# Patient Record
Sex: FEMALE | Race: WHITE | NOT HISPANIC OR LATINO | URBAN - METROPOLITAN AREA
[De-identification: names, ages, dates, MRNs, and addresses within clinical notes are randomized per-mention and may not be internally consistent; named-entity substitution may affect disease eponyms.]

---

## 2017-07-05 ENCOUNTER — OFFICE VISIT (OUTPATIENT)
Dept: ENDOCRINOLOGY | Facility: CLINIC | Age: 62
End: 2017-07-05

## 2017-07-05 VITALS
DIASTOLIC BLOOD PRESSURE: 70 MMHG | SYSTOLIC BLOOD PRESSURE: 110 MMHG | HEIGHT: 66 IN | WEIGHT: 159.8 LBS | OXYGEN SATURATION: 99 % | BODY MASS INDEX: 25.68 KG/M2 | HEART RATE: 72 BPM

## 2017-07-05 DIAGNOSIS — E03.8 HYPOTHYROIDISM DUE TO HASHIMOTO'S THYROIDITIS: Primary | ICD-10-CM

## 2017-07-05 DIAGNOSIS — E06.3 HYPOTHYROIDISM DUE TO HASHIMOTO'S THYROIDITIS: Primary | ICD-10-CM

## 2017-07-05 PROBLEM — F41.8 DEPRESSION WITH ANXIETY: Status: ACTIVE | Noted: 2017-07-05

## 2017-07-05 PROCEDURE — 99213 OFFICE O/P EST LOW 20 MIN: CPT | Performed by: INTERNAL MEDICINE

## 2017-07-05 RX ORDER — CITALOPRAM 40 MG/1
40 TABLET ORAL DAILY
COMMUNITY
End: 2018-01-10

## 2017-07-05 RX ORDER — IBUPROFEN 800 MG/1
TABLET ORAL
Refills: 2 | COMMUNITY
Start: 2017-06-29

## 2017-07-05 RX ORDER — ATORVASTATIN CALCIUM 40 MG/1
40 TABLET, FILM COATED ORAL DAILY
COMMUNITY

## 2017-07-05 RX ORDER — AMOXICILLIN 500 MG/1
CAPSULE ORAL
Refills: 0 | COMMUNITY
Start: 2017-06-29 | End: 2018-01-10

## 2017-07-05 RX ORDER — LEVOTHYROXINE SODIUM 88 UG/1
88 TABLET ORAL DAILY
COMMUNITY
End: 2017-07-05 | Stop reason: SDUPTHER

## 2017-07-05 RX ORDER — LEVOTHYROXINE SODIUM 88 UG/1
88 TABLET ORAL EVERY MORNING
Qty: 90 TABLET | Refills: 3 | Status: SHIPPED | OUTPATIENT
Start: 2017-07-05 | End: 2018-01-24 | Stop reason: SDUPTHER

## 2017-07-05 NOTE — PROGRESS NOTES
"Chief Complaint   Patient presents with   • Hypothyroidism     Follow UP        HPI:   Desi Chavarria is a 61 y.o.female who presents to Endocrine Clinic for f/u evaluation of her hypothyroidism. Last clinic visit with me on 09/24/2015 at [facility]. She is transferring care to my new practice.  Her history is as follows:    Interim Events:  - reports increased stress/depression with recent deaths in family  - strained back while doing yard work  - otherwise no new medical issues    1) hypothyroidism due to Hashimoto's thyroiditis:  - h/o goiter on physical exam diagnosed at the age of 20. She had normal thyroid function at that time, but was prescribed 25 mcg of levothyroxine daily for goiter suppression. She stopped the medication a few months later on her own accord due to symptoms of palpitations/\"jitteriness\".    - In October/November 2014 outside lab evaluation was consistent with hypothyroidism:  10/2014: TSH 10.46 (0.50 - 8.90); free T4 0.55 (0.93 - 1.7)  11/2014: TSH 7.43; free T4 0.74  Pt was prescribed levothyroxine at that time and the dose was titrated to 88 mcg daily by January 2015 for a TSH of 3.54.    - March 2015: She was then seen in consultation by me in  Endocrine clinic and her TSH was found to be 1.62 on Synthroid 88 mcg qd.     Current Dose: levothyroxine 88 mcg  - takes levothyroxine 88 mcg q AM on an empty stomach and waits 1 hr before eating. No Calcium, MVI, or iron, or hot liquids with the levothyroxine.    Recent TSH: (02/2017) 0.83 - normal    2) pt with h/o globus sensation:  - April 30, 2015: evaluated with a modified barium swallow test/ and dysphagia team eval by speech pathology - results were normal. no esophageal stricture, no aspiration, normal swallowing evaluation  - She does not have a goiter    3) Thyroid US: Thyroid US performed in clinic by me in March 2015 showed:  - The thyroid gland was normal in size by measured dimensions.  - The thyroid gland appeared overall hypoechoic " "with diffusely heterogeneous echotexture, fibrous stranding, and increased vascularity throughout the gland. These US characteristics were consistent with the patient's history of Hashimoto's hypothyroidism.  - No nodules were noted in either lobe or isthmus.  Recommendations: Repeat thyroid US recommended only if the thyroid gland changes on physical exam.     Review of Systems   Constitutional: Positive for fatigue.   HENT: Negative.    Eyes: Negative.    Respiratory: Negative.    Cardiovascular: Negative.    Gastrointestinal: Negative.    Endocrine: Negative.    Genitourinary: Negative.    Musculoskeletal: Positive for arthralgias and back pain.   Skin: Negative.    Allergic/Immunologic: Negative.    Neurological: Negative.    Hematological: Negative.    Psychiatric/Behavioral:        Depression with anxiety - on celexa     The following portions of the patient's history were reviewed and updated as appropriate: allergies, current medications, past family history, past medical history, past social history, past surgical history and problem list.    /70  Pulse 72  Ht 66\" (167.6 cm)  Wt 159 lb 12.8 oz (72.5 kg)  SpO2 99%  BMI 25.79 kg/m2  Physical Exam   Constitutional: She is oriented to person, place, and time. She appears well-developed. No distress.   HENT:   Head: Normocephalic.   Mouth/Throat: Oropharynx is clear and moist.   Eyes: Conjunctivae and EOM are normal. Pupils are equal, round, and reactive to light.   Neck: No tracheal deviation present. No thyromegaly present.   No palpable thyroid nodules     Cardiovascular: Normal rate, regular rhythm and normal heart sounds.    No murmur heard.  Pulmonary/Chest: Effort normal and breath sounds normal. No respiratory distress.   Lymphadenopathy:     She has no cervical adenopathy.   Neurological: She is alert and oriented to person, place, and time. No cranial nerve deficit.   Skin: Skin is warm and dry. She is not diaphoretic. No erythema. " "  Psychiatric: She has a normal mood and affect. Her behavior is normal.   Vitals reviewed.    LABS/IMAGING: outside labs reviewed and summarized in HPI    ASSESSMENT/PLAN:  1) hypothyroidism due to Hashimoto's thyroiditis:   - clinically euthyroid on exam  - TSH in February was well within normal range at 0.83  - continue levothyroxine 88 mcg q AM  - Reviewed proper levothyroxine administration, and factors to avoid that decrease medication potency and medication absorption.   - reassured patient that she does not need a \"complete thyroid panel\" to evaluate her hypothyroidism. Discussed with patient a TSH alone is the only test required to evaluate her established diagnosis of hypothyroidism due to Hashimoto's thyroiditis.   - reviewed with patient that she does not have a goiter    Pt scheduled to RTC in 6 months for hypothyroidism follow-up.              "

## 2018-01-10 ENCOUNTER — OFFICE VISIT (OUTPATIENT)
Dept: ENDOCRINOLOGY | Facility: CLINIC | Age: 63
End: 2018-01-10

## 2018-01-10 VITALS
BODY MASS INDEX: 25.5 KG/M2 | HEART RATE: 84 BPM | DIASTOLIC BLOOD PRESSURE: 64 MMHG | SYSTOLIC BLOOD PRESSURE: 118 MMHG | WEIGHT: 158 LBS | OXYGEN SATURATION: 99 %

## 2018-01-10 DIAGNOSIS — E03.8 HYPOTHYROIDISM DUE TO HASHIMOTO'S THYROIDITIS: Primary | ICD-10-CM

## 2018-01-10 DIAGNOSIS — E06.3 HYPOTHYROIDISM DUE TO HASHIMOTO'S THYROIDITIS: Primary | ICD-10-CM

## 2018-01-10 LAB
T4 FREE SERPL-MCNC: 1.04 NG/DL (ref 0.89–1.76)
TSH SERPL DL<=0.05 MIU/L-ACNC: 2.39 MIU/ML (ref 0.35–5.35)

## 2018-01-10 PROCEDURE — 84443 ASSAY THYROID STIM HORMONE: CPT | Performed by: INTERNAL MEDICINE

## 2018-01-10 PROCEDURE — 99213 OFFICE O/P EST LOW 20 MIN: CPT | Performed by: INTERNAL MEDICINE

## 2018-01-10 PROCEDURE — 84439 ASSAY OF FREE THYROXINE: CPT | Performed by: INTERNAL MEDICINE

## 2018-01-10 NOTE — PROGRESS NOTES
"Chief Complaint   Patient presents with   • Hypothyroidism     follow up       HPI:   Desi Chavarria is a 62 y.o.female who presents to Endocrine Clinic for f/u evaluation of her hypothyroidism. Last clinic visit with me on 07/05/2017. 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     1) hypothyroidism due to Hashimoto's thyroiditis:  - h/o goiter on physical exam diagnosed at the age of 20. She had normal thyroid function at that time, but was prescribed 25 mcg of levothyroxine daily for goiter suppression. She stopped the medication a few months later on her own accord due to symptoms of palpitations/\"jitteriness\".    - In October/November 2014 outside lab evaluation was consistent with hypothyroidism:  10/2014: TSH 10.46 (0.50 - 8.90); free T4 0.55 (0.93 - 1.7)  11/2014: TSH 7.43; free T4 0.74  Pt was prescribed levothyroxine at that time and the dose was titrated to 88 mcg daily by January 2015 for a TSH of 3.54.    - March 2015: She was then seen in consultation by me in  Endocrine clinic and her TSH was found to be 1.62 on Synthroid 88 mcg qd.     Current Dose: levothyroxine 88 mcg  - takes levothyroxine 88 mcg q AM on an empty stomach and waits 1 hr before eating. No Calcium, MVI, or iron, or hot liquids with the levothyroxine.  - not on biotin    2) pt with h/o globus sensation: now resolved  - April 30, 2015: evaluated with a modified barium swallow test/ and dysphagia team eval by speech pathology - results were normal. no esophageal stricture, no aspiration, normal swallowing evaluation  - She does not have a goiter    3) Thyroid US: Thyroid US performed in clinic by me in March 2015 showed:  - The thyroid gland was normal in size by measured dimensions.  - The thyroid gland appeared overall hypoechoic with diffusely heterogeneous echotexture, fibrous stranding, and " increased vascularity throughout the gland. These US characteristics were consistent with the patient's history of Hashimoto's hypothyroidism.  - No nodules were noted in either lobe or isthmus.  Recommendations: Repeat thyroid US recommended only if the thyroid gland changes on physical exam.     Review of Systems   Constitutional: Positive for fatigue.   HENT: Negative.    Eyes: Negative.    Respiratory: Negative.    Cardiovascular: Negative.    Gastrointestinal: Negative.    Endocrine: Negative.    Genitourinary: Negative.    Musculoskeletal: Positive for arthralgias.   Skin: Negative.    Allergic/Immunologic: Negative.    Neurological: Negative.    Hematological: Negative.    Psychiatric/Behavioral:        Depression      The following portions of the patient's history were reviewed and updated as appropriate: allergies, current medications, past family history, past medical history, past social history, past surgical history and problem list.    /64  Pulse 84  Wt 71.7 kg (158 lb)  SpO2 99%  BMI 25.5 kg/m2  Physical Exam   Constitutional: She is oriented to person, place, and time. She appears well-developed. No distress.   HENT:   Head: Normocephalic.   Mouth/Throat: Oropharynx is clear and moist.   Eyes: Conjunctivae and EOM are normal. Pupils are equal, round, and reactive to light.   Neck: No tracheal deviation present. No thyromegaly present.   No palpable thyroid nodules     Cardiovascular: Normal rate, regular rhythm and normal heart sounds.    No murmur heard.  Pulmonary/Chest: Effort normal and breath sounds normal. No respiratory distress.   Lymphadenopathy:     She has no cervical adenopathy.   Neurological: She is alert and oriented to person, place, and time. No cranial nerve deficit.   Skin: Skin is warm and dry. She is not diaphoretic. No erythema.   Psychiatric: She has a normal mood and affect. Her behavior is normal.   Vitals reviewed.    LABS/IMAGING: outside labs reviewed and  "summarized in HPI  Office Visit on 01/10/2018   Component Date Value Ref Range Status   • TSH 01/10/2018 2.393  0.350 - 5.350 mIU/mL Final   • Free T4 01/10/2018 1.04  0.89 - 1.76 ng/dL Final       ASSESSMENT/PLAN:  1) hypothyroidism due to Hashimoto's thyroiditis:   - clinically euthyroid on exam  - TSH today is within normal limits  - continue levothyroxine 88 mcg q AM  - Reviewed proper levothyroxine administration, and factors to avoid that decrease medication potency and medication absorption.   - reassured patient that she does not need a \"complete thyroid panel\" to evaluate her hypothyroidism.     Pt scheduled to RTC in 6 months for hypothyroidism follow-up.              "

## 2018-01-24 RX ORDER — LEVOTHYROXINE SODIUM 88 UG/1
88 TABLET ORAL EVERY MORNING
Qty: 90 TABLET | Refills: 3 | Status: SHIPPED | OUTPATIENT
Start: 2018-01-24